# Patient Record
Sex: FEMALE | Race: WHITE | NOT HISPANIC OR LATINO | Employment: STUDENT | URBAN - NONMETROPOLITAN AREA
[De-identification: names, ages, dates, MRNs, and addresses within clinical notes are randomized per-mention and may not be internally consistent; named-entity substitution may affect disease eponyms.]

---
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
## 2025-01-28 ENCOUNTER — OFFICE VISIT (OUTPATIENT)
Dept: DENTISTRY | Facility: CLINIC | Age: 20
End: 2025-01-28
Payer: COMMERCIAL

## 2025-01-28 VITALS — HEART RATE: 109 BPM | DIASTOLIC BLOOD PRESSURE: 83 MMHG | SYSTOLIC BLOOD PRESSURE: 121 MMHG

## 2025-01-28 DIAGNOSIS — K03.6 ACCRETIONS ON TEETH: Primary | ICD-10-CM

## 2025-01-28 PROCEDURE — D1110 PROPHYLAXIS - ADULT: HCPCS | Performed by: DENTAL HYGIENIST

## 2025-01-28 PROCEDURE — D0190 SCREENING OF A PATIENT: HCPCS | Performed by: DENTAL HYGIENIST

## 2025-01-28 PROCEDURE — D1330 ORAL HYGIENE INSTRUCTIONS: HCPCS | Performed by: DENTAL HYGIENIST

## 2025-01-28 PROCEDURE — D0210 INTRAORAL - COMPLETE SERIES OF RADIOGRAPHIC IMAGES: HCPCS | Performed by: DENTAL HYGIENIST

## 2025-01-28 NOTE — PROGRESS NOTES
Adult Prophy  Chief Complaint   Patient presents with    Routine Oral Cleaning   Pt states her gums hurt on LL - looks like trauma from brushing.  Pt also concerned about coffee stain.     Method Used:  Nicolás Method Used: Ultrasonic Scaling  Hand Scaling  Polished  Flossed    Radiographs Taken in [facility]: (Taken to assess periodontal health)  Complete mouth series    Intra/Extra Oral Cancer Screening:  Within normal limits    Oral Hygiene:  Fair    Plaque:  Light    Calculus:  Light    Bleeding:  Light    Stain:  Light  Moderate    Periodontal Charting:   Spot probing    Periodontal Classification:  Generalized  Mild  Gingivitis    Oral Hygiene Instruction:    Went over daily routine   Discussed  and clenching/grinding - pt states she has anxiety at night     No orders of the defined types were placed in this encounter.       Next Visit:  6 Month [facility]  Dentist visit- check FMX - periodic and resin in Oct

## 2025-02-10 ENCOUNTER — OFFICE VISIT (OUTPATIENT)
Dept: DENTISTRY | Facility: CLINIC | Age: 20
End: 2025-02-10

## 2025-02-10 DIAGNOSIS — K02.9 TOOTH DECAY: Primary | ICD-10-CM

## 2025-02-10 NOTE — PROGRESS NOTES
Periodic Exam    Shazia Mirian presents for periodic exam. Reviewed PMH, no changes.     Completed oral cancer screening, no abnormal findings.   Obtained Bws last visit  and reviewed findings. Completed restorative exam and perio spot probing.   New restorative needs charted.     BOP generally.      Pt tx planned for prophy.     Counseled pt for diet change. (Drink only water between meals and drink/eat anything else with mealtimes)    NV: resins and update Bws (check #4 and incipients)

## 2025-02-13 RX ORDER — SODIUM FLUORIDE 5 MG/ML
PASTE, DENTIFRICE DENTAL
Qty: 51 G | Refills: 20 | Status: SHIPPED | OUTPATIENT
Start: 2025-02-13

## 2025-03-06 ENCOUNTER — OFFICE VISIT (OUTPATIENT)
Dept: FAMILY MEDICINE CLINIC | Facility: HOME HEALTHCARE | Age: 20
End: 2025-03-06
Payer: COMMERCIAL

## 2025-03-06 VITALS
DIASTOLIC BLOOD PRESSURE: 60 MMHG | HEIGHT: 63 IN | TEMPERATURE: 97.9 F | RESPIRATION RATE: 18 BRPM | BODY MASS INDEX: 22.68 KG/M2 | WEIGHT: 128 LBS | SYSTOLIC BLOOD PRESSURE: 106 MMHG | OXYGEN SATURATION: 99 % | HEART RATE: 90 BPM

## 2025-03-06 DIAGNOSIS — K52.9 GASTROENTERITIS: Primary | ICD-10-CM

## 2025-03-06 PROCEDURE — 99213 OFFICE O/P EST LOW 20 MIN: CPT | Performed by: PHYSICIAN ASSISTANT

## 2025-03-06 PROCEDURE — T1015 CLINIC SERVICE: HCPCS | Performed by: FAMILY MEDICINE

## 2025-03-10 ENCOUNTER — CLINICAL SUPPORT (OUTPATIENT)
Dept: FAMILY MEDICINE CLINIC | Facility: HOME HEALTHCARE | Age: 20
End: 2025-03-10
Payer: COMMERCIAL

## 2025-03-10 DIAGNOSIS — Z30.9 ENCOUNTER FOR CONTRACEPTIVE MANAGEMENT, UNSPECIFIED TYPE: Primary | ICD-10-CM

## 2025-03-10 LAB — SL AMB POCT URINE HCG: NORMAL

## 2025-03-10 PROCEDURE — 81025 URINE PREGNANCY TEST: CPT | Performed by: FAMILY MEDICINE

## 2025-03-19 DIAGNOSIS — K02.9 TOOTH DECAY: ICD-10-CM

## 2025-03-20 RX ORDER — 1.1% SODIUM FLUORIDE PRESCRIPTION DENTAL CREAM 5 MG/G
CREAM DENTAL
Qty: 242.857 G | Refills: 20 | Status: SHIPPED | OUTPATIENT
Start: 2025-03-20

## 2025-06-10 ENCOUNTER — CLINICAL SUPPORT (OUTPATIENT)
Dept: FAMILY MEDICINE CLINIC | Facility: HOME HEALTHCARE | Age: 20
End: 2025-06-10

## 2025-06-10 DIAGNOSIS — Z30.09 BIRTH CONTROL COUNSELING: Primary | ICD-10-CM

## 2025-06-10 DIAGNOSIS — F41.1 GENERALIZED ANXIETY DISORDER: ICD-10-CM

## 2025-06-10 DIAGNOSIS — Z30.42 ENCOUNTER FOR DEPO-PROVERA CONTRACEPTION: ICD-10-CM

## 2025-06-10 RX ORDER — HYDROXYZINE PAMOATE 50 MG/1
50 CAPSULE ORAL
Qty: 30 CAPSULE | Refills: 1 | Status: SHIPPED | OUTPATIENT
Start: 2025-06-10

## 2025-06-17 ENCOUNTER — OFFICE VISIT (OUTPATIENT)
Dept: FAMILY MEDICINE CLINIC | Facility: HOME HEALTHCARE | Age: 20
End: 2025-06-17
Payer: COMMERCIAL

## 2025-06-17 VITALS
SYSTOLIC BLOOD PRESSURE: 102 MMHG | TEMPERATURE: 97.8 F | OXYGEN SATURATION: 98 % | BODY MASS INDEX: 21.83 KG/M2 | DIASTOLIC BLOOD PRESSURE: 78 MMHG | HEIGHT: 63 IN | RESPIRATION RATE: 18 BRPM | WEIGHT: 123.2 LBS | HEART RATE: 95 BPM

## 2025-06-17 DIAGNOSIS — Z01.84 IMMUNITY STATUS TESTING: ICD-10-CM

## 2025-06-17 DIAGNOSIS — Z11.1 SCREENING FOR TUBERCULOSIS: ICD-10-CM

## 2025-06-17 DIAGNOSIS — Z02.0 SCHOOL PHYSICAL EXAM: Primary | ICD-10-CM

## 2025-06-17 PROCEDURE — T1015 CLINIC SERVICE: HCPCS | Performed by: FAMILY MEDICINE

## 2025-06-17 PROCEDURE — 99214 OFFICE O/P EST MOD 30 MIN: CPT | Performed by: PHYSICIAN ASSISTANT

## 2025-06-17 NOTE — PROGRESS NOTES
"Name: Shazia Vela      : 2005      MRN: [id number]  Encounter Provider: Matt Abreu PA-C  Encounter Date: 2025   Encounter department: [facility]  :  Assessment & Plan  School physical exam         Screening for tuberculosis    Orders:  •  Quantiferon TB Gold Plus Assay; Future    Immunity status testing    Orders:  •  Measles/Mumps/Rubella Immunity; Future  •  Hepatitis B surface antibody; Future          Depression Screening and Follow-up Plan: Patient was screened for depression during today's encounter. They screened negative with a PHQ-9 score of 0.        History of Present Illness   Shazia is a 19 year old female presenting for a school physical.    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  She requires TB screening as well as Hep B and MMR titers.      Review of Systems   Constitutional:  Negative for chills, diaphoresis and fever.   Eyes:  Negative for visual disturbance.   Respiratory:  Negative for cough, chest tightness, shortness of breath and wheezing.    Cardiovascular:  Negative for chest pain, palpitations and leg swelling.   Gastrointestinal:  Negative for abdominal pain, blood in stool, constipation, diarrhea, nausea and vomiting.   Genitourinary:  Negative for dysuria, frequency, hematuria and urgency.   Skin:  Negative for rash and wound.   Neurological:  Negative for dizziness, syncope, weakness, light-headedness and headaches.   All other systems reviewed and are negative.      Objective   /78 (BP Location: Left arm, Patient Position: Sitting, Cuff Size: Standard)   Pulse 95   Temp 97.8 °F (36.6 °C) (Tympanic)   Resp 18   Ht 5' 3\" (1.6 m)   Wt 55.9 kg (123 lb 3.2 oz)   LMP  (LMP Unknown)   SpO2 98%   BMI 21.82 kg/m²      Hearing Screening    500Hz 1000Hz 2000Hz 4000Hz   Right ear 20,25,40 20,25,40 20,25,40 20,25,40   Left ear 20,25,40 20,25,40 20,25,40 20,25,40     Vision Screening    Right eye Left eye Both " eyes   Without correction      With correction 20/20 20/20 20/20      Physical Exam  Vitals and nursing note reviewed.   Constitutional:       General: She is not in acute distress.     Appearance: Normal appearance.   HENT:      Head: Normocephalic and atraumatic.      Right Ear: Tympanic membrane, ear canal and external ear normal.      Left Ear: Tympanic membrane, ear canal and external ear normal.      Nose: Nose normal.      Mouth/Throat:      Mouth: Mucous membranes are moist.      Pharynx: Oropharynx is clear. No oropharyngeal exudate.     Eyes:      Extraocular Movements: Extraocular movements intact.      Conjunctiva/sclera: Conjunctivae normal.      Pupils: Pupils are equal, round, and reactive to light.       Cardiovascular:      Rate and Rhythm: Normal rate and regular rhythm.      Heart sounds: Normal heart sounds. No murmur heard.  Pulmonary:      Effort: Pulmonary effort is normal. No respiratory distress.      Breath sounds: Normal breath sounds. No wheezing.     Musculoskeletal:         General: Normal range of motion.      Cervical back: Normal range of motion and neck supple.      Right lower leg: No edema.      Left lower leg: No edema.   Lymphadenopathy:      Cervical: No cervical adenopathy.     Skin:     General: Skin is warm and dry.     Neurological:      General: No focal deficit present.      Mental Status: She is alert and oriented to person, place, and time.      Cranial Nerves: No cranial nerve deficit.      Motor: No weakness.      Coordination: Coordination normal.      Gait: Gait normal.     Psychiatric:         Mood and Affect: Mood normal.         Behavior: Behavior normal.

## 2025-06-24 ENCOUNTER — APPOINTMENT (OUTPATIENT)
Dept: LAB | Facility: HOSPITAL | Age: 20
End: 2025-06-24
Payer: COMMERCIAL

## 2025-06-24 DIAGNOSIS — Z11.1 SCREENING FOR TUBERCULOSIS: ICD-10-CM

## 2025-06-24 DIAGNOSIS — Z01.84 IMMUNITY STATUS TESTING: ICD-10-CM

## 2025-06-24 LAB — HBV SURFACE AB SER-ACNC: 4.57 MIU/ML

## 2025-06-24 PROCEDURE — 86762 RUBELLA ANTIBODY: CPT

## 2025-06-24 PROCEDURE — 86735 MUMPS ANTIBODY: CPT

## 2025-06-24 PROCEDURE — 86765 RUBEOLA ANTIBODY: CPT

## 2025-06-24 PROCEDURE — 86706 HEP B SURFACE ANTIBODY: CPT

## 2025-06-24 PROCEDURE — 86480 TB TEST CELL IMMUN MEASURE: CPT

## 2025-06-24 PROCEDURE — 36415 COLL VENOUS BLD VENIPUNCTURE: CPT

## 2025-06-25 LAB
GAMMA INTERFERON BACKGROUND BLD IA-ACNC: 0.03 IU/ML
M TB IFN-G BLD-IMP: NEGATIVE
M TB IFN-G CD4+ BCKGRND COR BLD-ACNC: 0.01 IU/ML
M TB IFN-G CD4+ BCKGRND COR BLD-ACNC: 0.03 IU/ML
MEV IGG SER IA-ACNC: 86.7 AU/ML
MITOGEN IGNF BCKGRD COR BLD-ACNC: 4.18 IU/ML
MUV IGG SER IA-ACNC: 68.2 AU/ML
RUBV IGG SERPL IA-ACNC: 1.66 INDEX

## 2025-07-10 ENCOUNTER — OFFICE VISIT (OUTPATIENT)
Dept: FAMILY MEDICINE CLINIC | Facility: HOME HEALTHCARE | Age: 20
End: 2025-07-10
Payer: COMMERCIAL

## 2025-07-10 ENCOUNTER — TELEPHONE (OUTPATIENT)
Dept: FAMILY MEDICINE CLINIC | Facility: HOME HEALTHCARE | Age: 20
End: 2025-07-10

## 2025-07-10 VITALS
RESPIRATION RATE: 18 BRPM | SYSTOLIC BLOOD PRESSURE: 112 MMHG | TEMPERATURE: 97.6 F | BODY MASS INDEX: 22.04 KG/M2 | HEART RATE: 115 BPM | DIASTOLIC BLOOD PRESSURE: 68 MMHG | WEIGHT: 124.4 LBS | HEIGHT: 63 IN | OXYGEN SATURATION: 98 %

## 2025-07-10 DIAGNOSIS — J01.10 ACUTE FRONTAL SINUSITIS, RECURRENCE NOT SPECIFIED: ICD-10-CM

## 2025-07-10 DIAGNOSIS — R05.1 ACUTE COUGH: Primary | ICD-10-CM

## 2025-07-10 DIAGNOSIS — Z23 ENCOUNTER FOR IMMUNIZATION: Primary | ICD-10-CM

## 2025-07-10 DIAGNOSIS — Z23 ENCOUNTER FOR IMMUNIZATION: ICD-10-CM

## 2025-07-10 PROCEDURE — T1015 CLINIC SERVICE: HCPCS | Performed by: FAMILY MEDICINE

## 2025-07-10 PROCEDURE — 90744 HEPB VACC 3 DOSE PED/ADOL IM: CPT | Performed by: FAMILY MEDICINE

## 2025-07-10 PROCEDURE — 99213 OFFICE O/P EST LOW 20 MIN: CPT

## 2025-07-10 RX ORDER — AZITHROMYCIN 250 MG/1
500 TABLET, FILM COATED ORAL EVERY 24 HOURS
Qty: 10 TABLET | Refills: 0 | Status: SHIPPED | OUTPATIENT
Start: 2025-07-10 | End: 2025-07-15

## 2025-07-10 RX ORDER — BENZONATATE 200 MG/1
200 CAPSULE ORAL 3 TIMES DAILY PRN
Qty: 20 CAPSULE | Refills: 0 | Status: SHIPPED | OUTPATIENT
Start: 2025-07-10

## 2025-07-10 NOTE — TELEPHONE ENCOUNTER
Patient scheduled for - 07/10/2025  Nurse visit type - IMMUNIZATIONS  Patient requesting - Hep B  Order: needed    Please review and advise if patient will be receiving the Hep B series (0,3,6 months) to schedule accordingly. Per chart review, patient completed Hep B.

## 2025-07-10 NOTE — PROGRESS NOTES
Name: Shazia Vela      : 2005      MRN: [id number]  Encounter Provider: Marnie Muniz DO  Encounter Date: 7/10/2025   Encounter department: [facility]  :  Assessment & Plan  Acute cough  Productive cough for 10 days. Tried dayquil and nyquil with no relief.     - trial benzonatate 200mg TID for cough  Orders:    benzonatate (TESSALON) 200 MG capsule; Take 1 capsule (200 mg total) by mouth 3 (three) times a day as needed for cough    Encounter for immunization  Hep B titer low. Received first Hep B booster this visit.    - schedule second booster in one month       Acute frontal sinusitis, recurrence not specified  Started with sore throat and nasal congestion 10 days ago. Patient now has productive cough with mild tenderness along frontal sinus  Suspecting bacterial sinusitis due to the length of illness.    - trial zithromax 250mg BID for 5 days  Orders:    azithromycin (ZITHROMAX) 250 mg tablet; Take 2 tablets (500 mg total) by mouth every 24 hours for 5 days           History of Present Illness   HPI  Shazia Vela is a 18 yo female with hx of IBS, GERD, substance abuse, anxiety, depression presents with subacute cough. Patient reported 10 days ago she had sore throat and nasal congestion for a few days then she started to have productive cough all day long until now. She has close contact with sick individual, her sister. She tried dayquil and nyquil with minimal cough relief. She is currently smoking and vaping nitcotine on the weekend. Denied fever, chills, headache, CP, SOB, abdominal pain, changes w/ urination or bowel movement. She admitted mild frontal sinus tenderness. Denied allergy or asthma hs.       Review of Systems   Constitutional:  Negative for appetite change, chills and fever.   HENT:  Positive for sinus pressure and sinus pain. Negative for congestion, ear pain, postnasal drip, rhinorrhea and sore throat.    Eyes:  Negative for visual disturbance.  "  Respiratory:  Positive for cough. Negative for shortness of breath and wheezing.    Cardiovascular:  Negative for chest pain and palpitations.   Gastrointestinal:  Negative for abdominal pain, diarrhea, nausea and vomiting.   Genitourinary:  Negative for dysuria and hematuria.   Musculoskeletal:  Negative for arthralgias and back pain.   Skin:  Negative for color change and rash.   Allergic/Immunologic: Negative for environmental allergies and food allergies.   Neurological:  Negative for headaches.   All other systems reviewed and are negative.      Objective   /68 (BP Location: Left arm, Patient Position: Sitting, Cuff Size: Standard)   Pulse (!) 115   Temp 97.6 °F (36.4 °C) (Tympanic)   Resp 18   Ht 5' 3\" (1.6 m)   Wt 56.4 kg (124 lb 6.4 oz)   LMP  (LMP Unknown)   SpO2 98%   BMI 22.04 kg/m²      Physical Exam  Vitals and nursing note reviewed.   Constitutional:       General: She is not in acute distress.     Appearance: Normal appearance. She is well-developed. She is not ill-appearing.   HENT:      Head: Normocephalic and atraumatic.      Right Ear: Tympanic membrane, ear canal and external ear normal. There is no impacted cerumen.      Left Ear: Tympanic membrane, ear canal and external ear normal. There is no impacted cerumen.      Nose: Nose normal. No congestion.      Mouth/Throat:      Mouth: Mucous membranes are moist.      Pharynx: Oropharynx is clear. No oropharyngeal exudate or posterior oropharyngeal erythema.      Comments: Mild cobblestoning and erythema of the oropharynx    Eyes:      General:         Right eye: No discharge.         Left eye: No discharge.      Extraocular Movements: Extraocular movements intact.      Conjunctiva/sclera: Conjunctivae normal.      Pupils: Pupils are equal, round, and reactive to light.       Cardiovascular:      Rate and Rhythm: Normal rate and regular rhythm.      Pulses: Normal pulses.      Heart sounds: Normal heart sounds. No murmur " heard.  Pulmonary:      Effort: Pulmonary effort is normal. No respiratory distress.      Breath sounds: Normal breath sounds. No wheezing or rales.   Abdominal:      General: Abdomen is flat. Bowel sounds are normal. There is no distension.      Palpations: Abdomen is soft.      Tenderness: There is no abdominal tenderness. There is no guarding.     Musculoskeletal:         General: No swelling. Normal range of motion.      Cervical back: Normal range of motion and neck supple.      Right lower leg: No edema.      Left lower leg: No edema.     Skin:     General: Skin is warm and dry.      Capillary Refill: Capillary refill takes less than 2 seconds.      Findings: No rash.     Neurological:      General: No focal deficit present.      Mental Status: She is alert and oriented to person, place, and time.     Psychiatric:         Mood and Affect: Mood normal.       Administrative Statements   I have spent a total time of 20 minutes in caring for this patient on the day of the visit/encounter including Impressions, Counseling / Coordination of care, Documenting in the medical record, Reviewing/placing orders in the medical record (including tests, medications, and/or procedures), and Obtaining or reviewing history  .

## 2025-07-21 ENCOUNTER — TELEPHONE (OUTPATIENT)
Dept: FAMILY MEDICINE CLINIC | Facility: HOME HEALTHCARE | Age: 20
End: 2025-07-21

## 2025-07-21 DIAGNOSIS — N89.8 VAGINAL IRRITATION: Primary | ICD-10-CM

## 2025-07-21 RX ORDER — FLUCONAZOLE 150 MG/1
150 TABLET ORAL ONCE
Qty: 1 TABLET | Refills: 0 | Status: SHIPPED | OUTPATIENT
Start: 2025-07-21 | End: 2025-07-21

## 2025-07-29 ENCOUNTER — OFFICE VISIT (OUTPATIENT)
Dept: DENTISTRY | Facility: CLINIC | Age: 20
End: 2025-07-29

## 2025-07-29 DIAGNOSIS — K03.6 ACCRETIONS ON TEETH: Primary | ICD-10-CM

## 2025-07-30 ENCOUNTER — OFFICE VISIT (OUTPATIENT)
Dept: DENTISTRY | Facility: CLINIC | Age: 20
End: 2025-07-30

## 2025-07-30 DIAGNOSIS — R19.8 CLENCHING OF TEETH: Primary | ICD-10-CM

## 2025-08-08 DIAGNOSIS — Z30.42 ENCOUNTER FOR DEPO-PROVERA CONTRACEPTION: ICD-10-CM

## 2025-08-08 RX ORDER — MEDROXYPROGESTERONE ACETATE 104 MG/.65ML
0.65 INJECTION, SUSPENSION SUBCUTANEOUS
Qty: 0.65 ML | Refills: 0 | Status: SHIPPED | OUTPATIENT
Start: 2025-08-08

## 2025-08-11 ENCOUNTER — TELEPHONE (OUTPATIENT)
Age: 20
End: 2025-08-11

## 2025-08-11 ENCOUNTER — CLINICAL SUPPORT (OUTPATIENT)
Age: 20
End: 2025-08-11

## 2025-08-15 ENCOUNTER — APPOINTMENT (OUTPATIENT)
Dept: LAB | Facility: HOSPITAL | Age: 20
End: 2025-08-15
Payer: COMMERCIAL

## 2025-08-18 ENCOUNTER — RESULTS FOLLOW-UP (OUTPATIENT)
Age: 20
End: 2025-08-18

## 2025-08-20 ENCOUNTER — CLINICAL SUPPORT (OUTPATIENT)
Age: 20
End: 2025-08-20

## 2025-08-20 DIAGNOSIS — Z23 ENCOUNTER FOR IMMUNIZATION: Primary | ICD-10-CM

## 2025-08-20 PROCEDURE — 90471 IMMUNIZATION ADMIN: CPT

## 2025-08-20 PROCEDURE — 90716 VAR VACCINE LIVE SUBQ: CPT
